# Patient Record
Sex: FEMALE | Race: WHITE | URBAN - METROPOLITAN AREA
[De-identification: names, ages, dates, MRNs, and addresses within clinical notes are randomized per-mention and may not be internally consistent; named-entity substitution may affect disease eponyms.]

---

## 2017-05-30 ENCOUNTER — THERAPY VISIT (OUTPATIENT)
Dept: PHYSICAL THERAPY | Facility: CLINIC | Age: 22
End: 2017-05-30
Payer: COMMERCIAL

## 2017-05-30 DIAGNOSIS — M25.512 LEFT SHOULDER PAIN: Primary | ICD-10-CM

## 2017-05-30 PROCEDURE — 97161 PT EVAL LOW COMPLEX 20 MIN: CPT | Mod: GP | Performed by: PHYSICAL THERAPIST

## 2017-05-30 PROCEDURE — 97110 THERAPEUTIC EXERCISES: CPT | Mod: GP | Performed by: PHYSICAL THERAPIST

## 2017-05-30 NOTE — MR AVS SNAPSHOT
"              After Visit Summary   5/30/2017    Kathie Honeycutt    MRN: [id number]           Patient Information     Date Of Birth          1995        Visit Information        Provider Department      5/30/2017 2:30 PM Marielos Messer, PT [facility]        Today's Diagnoses     Left shoulder pain    -  1       Follow-ups after your visit        Your next 10 appointments already scheduled     Jun 02, 2017 11:40 AM CDT   CHRISTOPHER Extremity with Marielos Messer PT   [facility] Physical Therapy ([place]  )    [address]  [place] [address] [phone]              Who to contact     If you have questions or need follow up information about today's clinic visit or your schedule please contact [facility] PHYSICAL THERAPY directly at [phone].  Normal or non-critical lab and imaging results will be communicated to you by Information Gatewayhart, letter or phone within 4 business days after the clinic has received the results. If you do not hear from us within 7 days, please contact the clinic through Information Gatewayhart or phone. If you have a critical or abnormal lab result, we will notify you by phone as soon as possible.  Submit refill requests through eEye or call your pharmacy and they will forward the refill request to us. Please allow 3 business days for your refill to be completed.          Additional Information About Your Visit        Information Gatewayhart Information     eEye lets you send messages to your doctor, view your test results, renew your prescriptions, schedule appointments and more. To sign up, go to [email or web address] . Click on \"Log in\" on the left side of the screen, which will take you to the Welcome page. Then click on \"Sign up Now\" on the right side of the page.     You will be asked to enter the access code listed below, as well as some personal information. Please follow the " directions to create your username and password.     Your access code is: [email or web address]  Expires: 2017  4:29 PM     Your access code will  in 90 days. If you need help or a new code, please call your [place] clinic or [phone].        Care EveryWhere ID     This is your Care EveryWhere ID. This could be used by other organizations to access your [place] medical records  [id number]         Blood Pressure from Last 3 Encounters:   No data found for BP    Weight from Last 3 Encounters:   No data found for Wt              We Performed the Following     HC PT EVAL, LOW COMPLEXITY     CHRISTOPHER INITIAL EVAL REPORT     THERAPEUTIC EXERCISES        Primary Care Provider    None Specified       No primary provider on file.        Thank you!     Thank you for choosing [facility] PH[facility]ICAL THERAPY  for your care. Our goal is always to provide you with excellent care. Hearing back from our patients is one way we can continue to improve our services. Please take a few minutes to complete the written survey that you may receive in the mail after your visit with us. Thank you!             Your Updated Medication List - Protect others around you: Learn how to safely use, store and throw away your medicines at www.disposemymeds.org.      Notice  As of 2017  4:29 PM    You have not been prescribed any medications.

## 2017-05-30 NOTE — LETTER
[facility] ATHLETIC [facility] PHYSICAL [facility]  [address]  [place] [address] [phone]    May 31, 2017    Re: Kathie Honeycutt   :   1995  MRN:  [id number]   REFERRING PHYSICIAN:   Naty Cheema     [facility]  Date of Initial Evaluation:  2017  Visits: 1 Rxs Used: 1  Reason for Referral:  Left shoulder pain    EVALUATION SUMMARY  Subjective:  Patient is a 21 year old female presenting with rehab left shoulder hpi.   Kathie Honeycutt is a 21 year old female with a left shoulder condition. Condition occurred: for unknown reasons.  This is a new condition  Pt reports she started to have pain in her left shoulder about 1 month ago. 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 She saw an MD in [facility] where she goes to school and had xrays that were negative. She saw her local MD here on 17 and was referred to PT. She will be traveling in [place] in 1 week backpacking.    Patient reports pain:  Medial (near the scapula ).  Radiates to:  Shoulder.   and is intermittent and reported as 1/10.  Associated symptoms:  Catching. Pain is worse during the day and worse in the P.M.  Exacerbated by: not sure what increases the pain. and relieved by rest.  Since onset symptoms are unchanged.  Special tests:  X-ray. Pertinent medical history includes:  None.  Medical allergies: no.  Other surgeries include:  None reported.  Current medications:  None as reported by the patient.  Current occupation is Student. Primary job tasks include:  Other (Computer Work).                     Objective:  System  Shoulder Evaluation:  ROM:  AROM:  normal  Flexion:  Right:  180  Strength:  normal (scapular weakness with winging of medial boarders)  Stability Testing:    Left shoulder stability positive testing:  Sulcus sign  Left shoulder stability negative  testing:  Apprehension  Special Tests:    Left shoulder negative for the following special tests:  Labral; Impingement and Rotator cuff tear  Palpation:  Palpation assessed shoulder: Pain with PA pressure over ribs 456 on the left.  Mobility Tests:    Glenohumeral anterior left:  Hypermobile    Glenohumeral posterior left:  Hypermobile    Glenohumeral inferior left:  Hypermobile    Scapulothoracic left:  Hypermobile    Scapulohumeral rhythm right:  Hypermobile       General   ROS      Re: Kathie Honeycutt   :   1995    Assessment/Plan:    Patient is a 21 year old female with left side shoulder complaints.    Patient has the following significant findings with corresponding treatment plan.                Diagnosis 1:  L shoulder pain possible rib dysfunction  Pain -  hot/cold therapy and manual therapy  Decreased strength - therapeutic exercise and therapeutic activities  Impaired muscle performance - neuro re-education  Decreased function - therapeutic activities    Therapy Evaluation Codes:   1) History comprised of:   Personal factors that impact the plan of care:      None.    Comorbidity factors that impact the plan of care are:      None.     Medications impacting care: None.  2) Examination of Body Systems comprised of:   Body structures and functions that impact the plan of care:      Shoulder.   Activity limitations that impact the plan of care are:      reaching.  3) Clinical presentation characteristics are:   Stable/Uncomplicated.  4) Decision-Making    Low complexity using standardized patient assessment instrument and/or measureable   assessment of functional outcome.  Cumulative Therapy Evaluation is: Low complexity.  Previous and current functional limitations:  (See Goal Flow Sheet for this information)    Short term and Long term goals: (See Goal Flow Sheet for this information)   Communication ability:  Patient appears to be able to clearly communicate and understand verbal and written  communication and follow directions correctly.  Treatment Explanation - The following has been discussed with the patient:   RX ordered/plan of care  Anticipated outcomes  Possible risks and side effects  This patient would benefit from PT intervention to resume normal activities.   Rehab potential is good.  Frequency:  1 X week, once daily  Duration:  for 6 weeks  Discharge Plan:  Achieve all LTG.  Independent in home treatment program.  Reach maximal therapeutic benefit.    Thank you for your referral.    INQUIRIES  Therapist: Marielos Messer  PT \[facility] [place] [facility]\""  [facility] PH[facility]ICAL THERAP[facility]  [address] [place] [address] [address]  [facility] [address]  Phone: [phone]  Fax: [phone]

## 2017-05-30 NOTE — PROGRESS NOTES
Subjective:    Patient is a 21 year old female presenting with rehab left shoulder hpi.   Kathie Honeycutt is a 21 year old female with a left shoulder condition.    Condition occurred: for unknown reasons.  This is a new condition  Pt reports she started to have pain in her left shoulder about 1 month ago. 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 She saw an MD in [facility] where she goes to school and had xrays that were negative. She saw her local MD here on 5-23-17 and was referred to PT. She will be traveling in [place] in 1 week backpacking.    Patient reports pain:  Medial (near the scapula ).  Radiates to:  Shoulder.   and is intermittent and reported as 1/10.  Associated symptoms:  Catching. Pain is worse during the day and worse in the P.M..  Exacerbated by: not sure what increases the pain. and relieved by rest.  Since onset symptoms are unchanged.  Special tests:  X-ray.                                                    Objective:    System                   Shoulder Evaluation:  ROM:  AROM:  normal  Flexion:  Right:  180                                Strength:  normal (scapular weakness with winging of medial boarders)                      Stability Testing:    Left shoulder stability positive testing:  Sulcus sign  Left shoulder stability negative testing:  Apprehension    Special Tests:      Left shoulder negative for the following special tests:  Labral; Impingement and Rotator cuff tear    Palpation:  Palpation assessed shoulder: Pain with PA pressure over ribs 456 on the left.      Mobility Tests:    Glenohumeral anterior left:  Hypermobile    Glenohumeral posterior left:  Hypermobile    Glenohumeral inferior left:  Hypermobile      Scapulothoracic left:  Hypermobile      Scapulohumeral rhythm right:  Hypermobile                                     General     ROS    Assessment/Plan:      Patient is a 21 year old  female with left side shoulder complaints.    Patient has the following significant findings with corresponding treatment plan.                Diagnosis 1:  L shoulder pain possible rib dysfunction  Pain -  hot/cold therapy and manual therapy    Decreased strength - therapeutic exercise and therapeutic activities  Impaired muscle performance - neuro re-education  Decreased function - therapeutic activities    Therapy Evaluation Codes:   1) History comprised of:   Personal factors that impact the plan of care:      None.    Comorbidity factors that impact the plan of care are:      None.     Medications impacting care: None.  2) Examination of Body Systems comprised of:   Body structures and functions that impact the plan of care:      Shoulder.   Activity limitations that impact the plan of care are:      reaching.  3) Clinical presentation characteristics are:   Stable/Uncomplicated.  4) Decision-Making    Low complexity using standardized patient assessment instrument and/or measureable assessment of functional outcome.  Cumulative Therapy Evaluation is: Low complexity.    Previous and current functional limitations:  (See Goal Flow Sheet for this information)    Short term and Long term goals: (See Goal Flow Sheet for this information)     Communication ability:  Patient appears to be able to clearly communicate and understand verbal and written communication and follow directions correctly.  Treatment Explanation - The following has been discussed with the patient:   RX ordered/plan of care  Anticipated outcomes  Possible risks and side effects  This patient would benefit from PT intervention to resume normal activities.   Rehab potential is good.    Frequency:  1 X week, once daily  Duration:  for 6 weeks  Discharge Plan:  Achieve all LTG.  Independent in home treatment program.  Reach maximal therapeutic benefit.    Please refer to the daily flowsheet for treatment today, total treatment time and time spent  performing 1:1 timed codes.

## 2017-05-31 NOTE — PROGRESS NOTES
Subjective:    Patient is a 21 year old female presenting with rehab left shoulder hpi.                                      Pertinent medical history includes:  None.  Medical allergies: no.  Other surgeries include:  None reported.  Current medications:  None as reported by the patient.  Current occupation is Student.    Primary job tasks include:  Other (Computer Work).                                Objective:    System    Physical Exam    General     ROS    Assessment/Plan:

## 2017-06-02 ENCOUNTER — THERAPY VISIT (OUTPATIENT)
Dept: PHYSICAL THERAPY | Facility: CLINIC | Age: 22
End: 2017-06-02
Payer: COMMERCIAL

## 2017-06-02 DIAGNOSIS — M25.512 LEFT SHOULDER PAIN: ICD-10-CM

## 2017-06-02 PROCEDURE — 97140 MANUAL THERAPY 1/> REGIONS: CPT | Mod: GP | Performed by: PHYSICAL THERAPIST

## 2017-06-02 PROCEDURE — 97110 THERAPEUTIC EXERCISES: CPT | Mod: GP | Performed by: PHYSICAL THERAPIST

## 2017-06-02 PROCEDURE — G0283 ELEC STIM OTHER THAN WOUND: HCPCS | Mod: GP | Performed by: PHYSICAL THERAPIST

## 2017-06-02 NOTE — LETTER
[facility]  [address]  [facility] [id number] [phone]    2017  Re: Kathie Honeycutt   :   1995  MRN:  [id number]   REFERRING PHYSICIAN:   Naty Cheema  [facility]  Date of Initial Evaluation:  2017  Visits:  Rxs Used: 2  Reason for Referral:  Left shoulder pain  DISCHARGE REPORT  Progress reporting period is from 17 to 17.     SUBJECTIVE    Subjective: May be feeling a little better. Did not go to chiropractor    Current Pain level: /10.     Previous pain level was  1/10  .   Changes in function:  Yes (See Goal flowsheet attached for changes in current functional level)  Adverse reaction to treatment or activity: None  OBJECTIVE  Changes noted in objective findings:  Patient has failed to return to therapy so current objective findings are unknown. and The objective findings below are from DOS 17.  Objective: Hurts with lifting. Has a backpack that will hit her hips.Still slight stiffness rib 4 and TTP rxd with mobilizations  and estim. She will return at the end of her trip in 2 months if still having pain. Pt did not return for further PT    ASSESSMENT/PLAN  Updated problem list and treatment plan: Diagnosis 1:  L shoulder pain    STG/LTGs have been met or progress has been made towards goals:  Yes (See Goal flow sheet completed today.)  Assessment of Progress: The patient has not returned to therapy. Current status is unknown.  Self Management Plans:  Patient has been instructed in a home treatment program.  Kathie continues to require the following intervention to meet STG and LTG's:      Recommendations:  DC PT      Thank you for your referral.    INQUIRIES  Therapist: Marielos Messer, PT, [facility]  [address]  [facility] [place] [facility] [address]  Phone: [phone]  Fax:  [phone]

## 2017-06-02 NOTE — MR AVS SNAPSHOT
"              After Visit Summary   2017    Kathie Honeycutt    MRN: [id number]           Patient Information     Date Of Birth          1995        Visit Information        Provider Department      2017 11:40 AM Marielos Messer PT [facility] Physical Therapy        Today's Diagnoses     Left shoulder pain           Follow-ups after your visit        Who to contact     If you have questions or need follow up information about today's clinic visit or your schedule please contact [facility] ATHLETIC [facility] PHYSICAL [facility] directly at [phone].  Normal or non-critical lab and imaging results will be communicated to you by EndoDexhart, letter or phone within 4 business days after the clinic has received the results. If you do not hear from us within 7 days, please contact the clinic through EndoDexhart or phone. If you have a critical or abnormal lab result, we will notify you by phone as soon as possible.  Submit refill requests through Lycera or call your pharmacy and they will forward the refill request to us. Please allow 3 business days for your refill to be completed.          Additional Information About Your Visit        MyChart Information     Lycera lets you send messages to your doctor, view your test results, renew your prescriptions, schedule appointments and more. To sign up, go to [email or web address] . Click on \"Log in\" on the left side of the screen, which will take you to the Welcome page. Then click on \"Sign up Now\" on the right side of the page.     You will be asked to enter the access code listed below, as well as some personal information. Please follow the directions to create your username and password.     Your access code is: [email or web address]  Expires: 2017  4:29 PM     Your access code will  in 90 days. If you need help or a new code, please call your [place] clinic or [phone].        Care EveryWhere ID     This " is your Care EveryWhere ID. This could be used by other organizations to access your [place] medical records  [id number]         Blood Pressure from Last 3 Encounters:   No data found for BP    Weight from Last 3 Encounters:   No data found for Wt              We Performed the Following     ELECTRIC STIMULATION THERAPY     MANUAL THER TECH,1+REGIONS,EA 15 MIN     THERAPEUTIC EXERCISES        Primary Care Provider    Provider Unknown       No address on file        Thank you!     Thank you for choosing [facility] PHYSICAL THERAPY  for your care. Our goal is always to provide you with excellent care. Hearing back from our patients is one way we can continue to improve our services. Please take a few minutes to complete the written survey that you may receive in the mail after your visit with us. Thank you!             Your Updated Medication List - Protect others around you: Learn how to safely use, store and throw away your medicines at www.disposemymeds.org.      Notice  As of 6/2/2017  1:06 PM    You have not been prescribed any medications.

## 2017-09-05 PROBLEM — M25.512 LEFT SHOULDER PAIN: Status: RESOLVED | Noted: 2017-05-30 | Resolved: 2017-09-05

## 2017-09-05 NOTE — PROGRESS NOTES
Subjective:    HPI                    Objective:    System    Physical Exam    General     ROS    Assessment/Plan:      DISCHARGE REPORT    Progress reporting period is from 5-30-17 to 6-2-17.       SUBJECTIVE    Subjective: May be feeling a little better. Did not go to chiropractor    Current Pain level: 1/10.     Previous pain level was  1/10  .   Changes in function:  Yes (See Goal flowsheet attached for changes in current functional level)  Adverse reaction to treatment or activity: None    OBJECTIVE  Changes noted in objective findings:  Patient has failed to return to therapy so current objective findings are unknown. and The objective findings below are from DOS 6-2-17.  Objective: Hurts with lifting. Has a backpack that will hit her hips.Still slight stiffness rib 4 and TTP rxd with mobilizations  and estim. She will return at the end of her trip in 2 months if still having pain. Pt did not return for further PT    ASSESSMENT/PLAN  Updated problem list and treatment plan: Diagnosis 1:  L shoulder pain    STG/LTGs have been met or progress has been made towards goals:  Yes (See Goal flow sheet completed today.)  Assessment of Progress: The patient has not returned to therapy. Current status is unknown.  Self Management Plans:  Patient has been instructed in a home treatment program.    Kathie continues to require the following intervention to meet STG and LTG's:      Recommendations:  DC PT    Please refer to the daily flowsheet for treatment today, total treatment time and time spent performing 1:1 timed codes.

## 2018-08-31 ENCOUNTER — OFFICE VISIT (OUTPATIENT)
Dept: URGENT CARE | Facility: URGENT CARE | Age: 23
End: 2018-08-31
Payer: COMMERCIAL

## 2018-08-31 VITALS
OXYGEN SATURATION: 100 % | DIASTOLIC BLOOD PRESSURE: 80 MMHG | HEART RATE: 95 BPM | SYSTOLIC BLOOD PRESSURE: 120 MMHG | TEMPERATURE: 101.6 F

## 2018-08-31 DIAGNOSIS — J02.9 SORE THROAT: ICD-10-CM

## 2018-08-31 DIAGNOSIS — R59.0 CERVICAL LYMPHADENOPATHY: ICD-10-CM

## 2018-08-31 DIAGNOSIS — R50.9 FEVER, UNSPECIFIED FEVER CAUSE: Primary | ICD-10-CM

## 2018-08-31 LAB
BASOPHILS # BLD AUTO: 0.1 10E9/L (ref 0–0.2)
BASOPHILS NFR BLD AUTO: 1.3 %
DEPRECATED S PYO AG THROAT QL EIA: NORMAL
DIFFERENTIAL METHOD BLD: NORMAL
EOSINOPHIL # BLD AUTO: 0.1 10E9/L (ref 0–0.7)
EOSINOPHIL NFR BLD AUTO: 1.5 %
ERYTHROCYTE [DISTWIDTH] IN BLOOD BY AUTOMATED COUNT: 13.6 % (ref 10–15)
HCT VFR BLD AUTO: 38.5 % (ref 35–47)
HETEROPH AB SER QL: NEGATIVE
HGB BLD-MCNC: 12.5 G/DL (ref 11.7–15.7)
LYMPHOCYTES # BLD AUTO: 3.1 10E9/L (ref 0.8–5.3)
LYMPHOCYTES NFR BLD AUTO: 57 %
MCH RBC QN AUTO: 29.5 PG (ref 26.5–33)
MCHC RBC AUTO-ENTMCNC: 32.5 G/DL (ref 31.5–36.5)
MCV RBC AUTO: 91 FL (ref 78–100)
MONOCYTES # BLD AUTO: 0.6 10E9/L (ref 0–1.3)
MONOCYTES NFR BLD AUTO: 10.1 %
NEUTROPHILS # BLD AUTO: 1.7 10E9/L (ref 1.6–8.3)
NEUTROPHILS NFR BLD AUTO: 30.1 %
PLATELET # BLD AUTO: 150 10E9/L (ref 150–450)
RBC # BLD AUTO: 4.24 10E12/L (ref 3.8–5.2)
SPECIMEN SOURCE: NORMAL
WBC # BLD AUTO: 5.5 10E9/L (ref 4–11)

## 2018-08-31 PROCEDURE — 85025 COMPLETE CBC W/AUTO DIFF WBC: CPT | Performed by: INTERNAL MEDICINE

## 2018-08-31 PROCEDURE — 36415 COLL VENOUS BLD VENIPUNCTURE: CPT | Performed by: INTERNAL MEDICINE

## 2018-08-31 PROCEDURE — 86308 HETEROPHILE ANTIBODY SCREEN: CPT | Performed by: INTERNAL MEDICINE

## 2018-08-31 PROCEDURE — 87081 CULTURE SCREEN ONLY: CPT | Performed by: INTERNAL MEDICINE

## 2018-08-31 PROCEDURE — 87880 STREP A ASSAY W/OPTIC: CPT | Performed by: INTERNAL MEDICINE

## 2018-08-31 PROCEDURE — 99203 OFFICE O/P NEW LOW 30 MIN: CPT | Performed by: INTERNAL MEDICINE

## 2018-08-31 ASSESSMENT — ENCOUNTER SYMPTOMS
HEADACHES: 0
DYSURIA: 0
PALPITATIONS: 0
SHORTNESS OF BREATH: 0
FEVER: 1
SORE THROAT: 1
ABDOMINAL PAIN: 0
COUGH: 0
FREQUENCY: 0
DIARRHEA: 0
CHILLS: 1
MYALGIAS: 0
VOMITING: 0
SINUS PAIN: 0
HEMATURIA: 0
FLANK PAIN: 0
NAUSEA: 0

## 2018-08-31 NOTE — MR AVS SNAPSHOT
"              After Visit Summary   8/31/2018    Kathie Honeycutt    MRN: [id number]           Patient Information     Date Of Birth          1995        Visit Information        Provider Department      8/31/2018 8:45 PM Salomon Sharma MD [facility]        Today's Diagnoses     Fever, unspecified fever cause    -  1    Sore throat        Cervical lymphadenopathy          Care Instructions    Follow up with your doctor if your symptoms persist/worsens, or if you develop new symptoms.    Wear mask if needing to travel publicly and you still have fever.          Follow-ups after your visit        Who to contact     If you have questions or need follow up information about today's clinic visit or your schedule please contact [facility] URGENT CARE directly at [phone].  Normal or non-critical lab and imaging results will be communicated to you by MyChart, letter or phone within 4 business days after the clinic has received the results. If you do not hear from us within 7 days, please contact the clinic through MyChart or phone. If you have a critical or abnormal lab result, we will notify you by phone as soon as possible.  Submit refill requests through Insitu Mobile or call your pharmacy and they will forward the refill request to us. Please allow 3 business days for your refill to be completed.          Additional Information About Your Visit        MyChart Information     Insitu Mobile lets you send messages to your doctor, view your test results, renew your prescriptions, schedule appointments and more. To sign up, go to [email or web address] . Click on \"Log in\" on the left side of the screen, which will take you to the Welcome page. Then click on \"Sign up Now\" on the right side of the page.     You will be asked to enter the access code listed below, as well as some personal information. Please follow the directions to create your username and password.   "   Your access code is: [email or web address]  Expires: 2018 10:04 PM     Your access code will  in 90 days. If you need help or a new code, please call your [place] clinic or [phone].        Care EveryWhere ID     This is your Care EveryWhere ID. This could be used by other organizations to access your [place] medical records  [id number]        Your Vitals Were     Pulse Temperature Pulse Oximetry             95 101.6  F (38.7  C) (Oral) 100%          Blood Pressure from Last 3 Encounters:   18 120/80    Weight from Last 3 Encounters:   No data found for Wt              We Performed the Following     Beta strep group A culture     CBC with platelets differential     Mononucleosis screen     Rapid strep screen        Primary Care Provider    Provider Not In System                Equal Access to Services     EDWIN GARCIA : Hadii roxann mendietao Soherber, waaxda luqadaha, qaybta kaalmada adeegyada, merritt davies . So [facility] [phone].    ATENCIÓN: Si habla español, tiene a wadsworth disposición servicios gratuitos de asistencia lingüística. Llame al [phone].    We comply with applicable federal civil rights laws and [place] laws. We do not discriminate on the basis of race, color, national origin, age, disability, sex, sexual orientation, or gender identity.            Thank you!     Thank you for choosing [facility] URGENT CARE  for your care. Our goal is always to provide you with excellent care. Hearing back from our patients is one way we can continue to improve our services. Please take a few minutes to complete the written survey that you may receive in the mail after your visit with us. Thank you!             Your Updated Medication List - Protect others around you: Learn how to safely use, store and throw away your medicines at www.disposemymeds.org.      Notice  As of 2018 10:05 PM    You have not been prescribed any medications.

## 2018-09-01 LAB
BACTERIA SPEC CULT: NORMAL
SPECIMEN SOURCE: NORMAL

## 2018-09-01 NOTE — PATIENT INSTRUCTIONS
Follow up with your doctor if your symptoms persist/worsens, or if you develop new symptoms.    Wear mask if needing to travel publicly and you still have fever.

## 2018-09-01 NOTE — PROGRESS NOTES
HPI    About 4 days prior to consultation, the patient developed fever ranging from  F and has noticed swelling and tenderness at the right-superior-anterior aspect of her neck. Has some mild sore throat. No other unusual symptoms noticed (see ROS).  No sick contacts.      Past medical history: Denies history of recurrent strep throat or infectious mononucleosis      Review of Systems   Constitutional: Positive for chills, fever and malaise/fatigue.   HENT: Positive for sore throat. Negative for congestion, ear discharge, ear pain and sinus pain.    Respiratory: Negative for cough and shortness of breath.    Cardiovascular: Negative for chest pain and palpitations.   Gastrointestinal: Negative for abdominal pain, diarrhea, nausea and vomiting.   Genitourinary: Negative for dysuria, flank pain, frequency, hematuria and urgency.   Musculoskeletal: Negative for joint pain and myalgias.   Skin: Negative for rash.   Neurological: Negative for headaches.       /80  Pulse 95  Temp 101.6  F (38.7  C) (Oral)  SpO2 100%      Physical Exam   Constitutional: She is oriented to person, place, and time. No distress.   HENT:   Right Ear: External ear normal.   Left Ear: External ear normal.   Mouth/Throat: Oropharynx is clear and moist. No oropharyngeal exudate.   Cardiovascular: Normal rate, regular rhythm and normal heart sounds.  Exam reveals no friction rub.    Pulmonary/Chest: Effort normal and breath sounds normal. No respiratory distress.   Abdominal: Soft. She exhibits no mass (No splenomegaly). There is no tenderness.   Musculoskeletal: She exhibits no edema.   Lymphadenopathy:     She has cervical adenopathy (Enlarged and tender jugulodigastric nodes R>L).   Neurological: She is alert and oriented to person, place, and time. Coordination normal. GCS score is 15.   Skin: No rash noted.   Vitals reviewed.        ICD-10-CM    1. Fever, unspecified fever cause R50.9 CBC with platelets differential     Rapid  strep screen     Mononucleosis screen   2. Sore throat J02.9 CBC with platelets differential     Rapid strep screen     Mononucleosis screen     Beta strep group A culture   3. Cervical lymphadenopathy R59.0 CBC with platelets differential     Rapid strep screen     Mononucleosis screen   **discussed lab results with patient and her mom at the examination room; unlikely to be bacterial infection and predominance of lymphocytes was suggestive of viral infection; patient advised continued observation, rest, and Tylenol as needed for fever      Patient Instructions   Follow up with your doctor if your symptoms persist/worsens, or if you develop new symptoms.    Wear mask if needing to travel publicly and you still have fever.